# Patient Record
Sex: MALE | Race: WHITE | NOT HISPANIC OR LATINO | Employment: OTHER | URBAN - METROPOLITAN AREA
[De-identification: names, ages, dates, MRNs, and addresses within clinical notes are randomized per-mention and may not be internally consistent; named-entity substitution may affect disease eponyms.]

---

## 2023-06-28 LAB
ERYTHROCYTE DISTRIBUTION WIDTH (RATIO) BY AUTOMATED COUNT: 13.3 % (ref 11.5–14.5)
ERYTHROCYTE MEAN CORPUSCULAR HEMOGLOBIN CONCENTRATION (G/DL) BY AUTOMATED: 32.5 G/DL (ref 32–36)
ERYTHROCYTE MEAN CORPUSCULAR VOLUME (FL) BY AUTOMATED COUNT: 89 FL (ref 80–100)
ERYTHROCYTES (10*6/UL) IN BLOOD BY AUTOMATED COUNT: 5.13 X10E12/L (ref 4.5–5.9)
ESTRADIOL (PG/ML) IN SER/PLAS: 22 PG/ML
HEMATOCRIT (%) IN BLOOD BY AUTOMATED COUNT: 45.8 % (ref 41–52)
HEMOGLOBIN (G/DL) IN BLOOD: 14.9 G/DL (ref 13.5–17.5)
LEUKOCYTES (10*3/UL) IN BLOOD BY AUTOMATED COUNT: 6.7 X10E9/L (ref 4.4–11.3)
NRBC (PER 100 WBCS) BY AUTOMATED COUNT: 0 /100 WBC (ref 0–0)
PLATELETS (10*3/UL) IN BLOOD AUTOMATED COUNT: 243 X10E9/L (ref 150–450)
PROSTATE SPECIFIC AG (NG/ML) IN SER/PLAS: 1.01 NG/ML (ref 0–4)
TESTOSTERONE (NG/DL) IN SER/PLAS: 291 NG/DL (ref 240–1000)

## 2024-01-12 ENCOUNTER — LAB (OUTPATIENT)
Dept: LAB | Facility: LAB | Age: 69
End: 2024-01-12
Payer: MEDICARE

## 2024-01-12 DIAGNOSIS — R79.89 OTHER SPECIFIED ABNORMAL FINDINGS OF BLOOD CHEMISTRY: ICD-10-CM

## 2024-01-12 DIAGNOSIS — N40.1 BENIGN PROSTATIC HYPERPLASIA WITH LOWER URINARY TRACT SYMPTOMS: Primary | ICD-10-CM

## 2024-01-12 LAB
ERYTHROCYTE [DISTWIDTH] IN BLOOD BY AUTOMATED COUNT: 12.6 % (ref 11.5–14.5)
ESTRADIOL SERPL-MCNC: 23 PG/ML
HCT VFR BLD AUTO: 47.7 % (ref 41–52)
HGB BLD-MCNC: 15.7 G/DL (ref 13.5–17.5)
MCH RBC QN AUTO: 29.6 PG (ref 26–34)
MCHC RBC AUTO-ENTMCNC: 32.9 G/DL (ref 32–36)
MCV RBC AUTO: 90 FL (ref 80–100)
NRBC BLD-RTO: 0 /100 WBCS (ref 0–0)
PLATELET # BLD AUTO: 207 X10*3/UL (ref 150–450)
PSA SERPL-MCNC: 1.18 NG/ML
RBC # BLD AUTO: 5.3 X10*6/UL (ref 4.5–5.9)
TESTOST SERPL-MCNC: 118 NG/DL (ref 240–1000)
WBC # BLD AUTO: 8.5 X10*3/UL (ref 4.4–11.3)

## 2024-01-12 PROCEDURE — 84403 ASSAY OF TOTAL TESTOSTERONE: CPT

## 2024-01-12 PROCEDURE — 85027 COMPLETE CBC AUTOMATED: CPT

## 2024-01-12 PROCEDURE — 36415 COLL VENOUS BLD VENIPUNCTURE: CPT

## 2024-01-12 PROCEDURE — 84153 ASSAY OF PSA TOTAL: CPT

## 2024-01-12 PROCEDURE — 82670 ASSAY OF TOTAL ESTRADIOL: CPT

## 2024-01-22 ENCOUNTER — OFFICE VISIT (OUTPATIENT)
Dept: UROLOGY | Facility: HOSPITAL | Age: 69
End: 2024-01-22
Payer: MEDICARE

## 2024-01-22 DIAGNOSIS — Z12.5 PROSTATE CANCER SCREENING: ICD-10-CM

## 2024-01-22 DIAGNOSIS — N40.0 BENIGN PROSTATIC HYPERPLASIA WITHOUT LOWER URINARY TRACT SYMPTOMS: ICD-10-CM

## 2024-01-22 DIAGNOSIS — R79.89 LOW TESTOSTERONE: Primary | ICD-10-CM

## 2024-01-22 DIAGNOSIS — E29.1 HYPOGONADISM IN MALE: ICD-10-CM

## 2024-01-22 PROCEDURE — 1036F TOBACCO NON-USER: CPT | Performed by: UROLOGY

## 2024-01-22 PROCEDURE — 1159F MED LIST DOCD IN RCRD: CPT | Performed by: UROLOGY

## 2024-01-22 PROCEDURE — 99214 OFFICE O/P EST MOD 30 MIN: CPT | Performed by: UROLOGY

## 2024-01-22 NOTE — PROGRESS NOTES
FUV    Last seen - 7/12/23     HISTORY OF PRESENT ILLNESS:   Stephen Miranda is a 68 y.o. male who is being seen today for fuv    h/o low testosterone, BPH, s/p PVP, s/p TURP on 10/06/20, presenting for a follow up.      Pt on androgel. Applying to his leg. Has been feeling well, exercising     Urinary symptoms - denies dysuria, hematuria       PAST MEDICAL HISTORY:  Past Medical History:   Diagnosis Date    Personal history of other malignant neoplasm of skin     History of malignant neoplasm of skin    Personal history of other medical treatment     History of echocardiogram       PAST SURGICAL HISTORY:  Past Surgical History:   Procedure Laterality Date    OTHER SURGICAL HISTORY  08/26/2020    Hernia repair    OTHER SURGICAL HISTORY  08/26/2020    Hip replacement    OTHER SURGICAL HISTORY  08/26/2020    Cholecystectomy    OTHER SURGICAL HISTORY  08/26/2020    Back surgery    OTHER SURGICAL HISTORY  10/22/2020    Cardiac catheterization        ALLERGIES:   Allergies   Allergen Reactions    Amoxicillin Diarrhea        MEDICATIONS:   No current outpatient medications     PHYSICAL EXAM:  There were no vitals taken for this visit.  Constitutional: Patient appears well-developed and well-nourished. No distress.    Pulmonary/Chest: Effort normal. No respiratory distress.   Abdominal: Soft, ND NT  Musculoskeletal: Normal range of motion.    Neurological: Alert and oriented to person, place, and time.  Psychiatric: Normal mood and affect. Behavior is normal. Thought content normal.      Labs  Lab Results   Component Value Date    TESTOSTERONE 118 (L) 01/12/2024    TESTOSTERONE 291 06/28/2023    TESTOSTERONE 138 (L) 12/30/2022     Lab Results   Component Value Date    ESTRADIOL 23 01/12/2024     Lab Results   Component Value Date    PSA 1.18 01/12/2024     Lab Results   Component Value Date    CREATININE 0.96 04/07/2021     Lab Results   Component Value Date    CHOL 189 09/20/2020     Lab Results   Component Value Date     HDL 56.0 09/20/2020     Lab Results   Component Value Date    CHHDL 3.4 09/20/2020     Lab Results   Component Value Date    LDLF 110 (H) 09/20/2020     Lab Results   Component Value Date    VLDL 23 09/20/2020     Lab Results   Component Value Date    TRIG 117 09/20/2020     Lab Results   Component Value Date    HGBA1C 5.7 09/20/2020     Lab Results   Component Value Date    HCT 47.7 01/12/2024       Assessment:      1. Low testosterone        2. Benign prostatic hyperplasia without lower urinary tract symptoms            Stephen Miranda is a 68 y.o. male here for FUV     Plan:   1)  Refilled androgel    Testosterone replacement therapy  The patient's lab work and clinical symptoms suggest that he has hypogonadism or testosterone deficiency.  I had a long discussion with the patient about the treatment options of his condition. This included lifestyle modification as well as supplementation.   Exogenous testosterone as well as the use of selective estrogen receptor modulators (SERMs), aromatase inhibitors, human gonadotropins were discussed.  The several treatment options including various formulations were discussed. I explained the risks, benefits, mechanisms, and use of each therapy.  I described potential side effects of treatment. Common side effects include acne, erythrocytosis, breast tenderness, and changes in behavior.  I highlighted the effect of exogenous testosterone on the reproductive system, including decrease in testicular volume and the negative affect on sperm production which may result in sterility, which could be permanent.    We discussed that current data suggest that external testosterone therapy does not cause an increase in the risk of developing prostate cancer, and can also be used safely in patients after prostate cancer treatment.  It may however cause an increase in prostate volume and PSA.  We discussed that the there is controversy in replacement therapies effect on the cardiovascular  system.     We also discussed the precautions necessary to keep the medication from contacting others, especially women and children, for whom contact with the medication can be particularly harmful.   He will use soap and water to wash his hands after application and will clean his skin before having skin-to-skin contact with others if he chooses gel application delivery of testosterone.  A monitoring schedule was discussed with includes routine evaluation of serum testosterone, hematocrit, lipid panel, and PSA.  No barriers to learning were identified.  After all of the patient's questions were satisfactorily answered, he expressed understanding of the risks of therapy.    I have personally reviewed the OARRS report for this patient. This report is scanned into the electronic medical record. I have considered the  risks of abuse, dependence, addiction and diversion.    2) Lower Urinary Tract Symptoms (LUTS)    I educated the patient on relevant lower urinary tract anatomy and physiology with emphasis on differential diagnosis as well as contributing factors to the patient's lower urinary tract symptoms. I educated the patient on dietary and behavioral modifications pertinent to the patient's complaints. I recommended to avoid caffeine, alcohol, spicy and acidic oral intake and to regulate fluid intake and voiding (timed voiding, double voiding). I stressed the importance of avoiding constipation and recommended stool softeners unless diarrhea present. We discussed limiting fluid intake at night and elevating legs prior to bedtime.     The mechanism of action as well as the risks, benefits, common side effects, and alternatives to all prescribed medications were discussed with the patient at length. The patient had the opportunity to ask questions and all questions were answered. I primarily discussed alpha blockers, 5ARIs, PDE5i, anticholinergics, and beta 3 agonist (mirabegron).  I explained that alpha blockers  help decrease bladder outlet resistance with potential side effects to include retrograde ejaculation, rhinitis, and light headedness. I explained that 5 alpha reductase inhibitors can shrink the prostate up to 30%, can artificially decrease their PSA value by 50%, but take approximately 6-9 months to reach full efficacy and have potential side effects to include decreased libido, impotence and breast tenderness.

## 2024-02-12 ENCOUNTER — LAB (OUTPATIENT)
Dept: LAB | Facility: LAB | Age: 69
End: 2024-02-12
Payer: MEDICARE

## 2024-02-12 DIAGNOSIS — Z00.00 ENCOUNTER FOR GENERAL ADULT MEDICAL EXAMINATION WITHOUT ABNORMAL FINDINGS: Primary | ICD-10-CM

## 2024-02-12 DIAGNOSIS — I10 ESSENTIAL (PRIMARY) HYPERTENSION: ICD-10-CM

## 2024-02-12 DIAGNOSIS — E78.2 MIXED HYPERLIPIDEMIA: ICD-10-CM

## 2024-02-12 LAB
ALBUMIN SERPL BCP-MCNC: 4.2 G/DL (ref 3.4–5)
ALP SERPL-CCNC: 69 U/L (ref 33–136)
ALT SERPL W P-5'-P-CCNC: 22 U/L (ref 10–52)
ANION GAP SERPL CALC-SCNC: 11 MMOL/L (ref 10–20)
AST SERPL W P-5'-P-CCNC: 16 U/L (ref 9–39)
BILIRUB SERPL-MCNC: 0.9 MG/DL (ref 0–1.2)
BUN SERPL-MCNC: 17 MG/DL (ref 6–23)
CALCIUM SERPL-MCNC: 9.6 MG/DL (ref 8.6–10.6)
CHLORIDE SERPL-SCNC: 108 MMOL/L (ref 98–107)
CHOLEST SERPL-MCNC: 133 MG/DL (ref 0–199)
CHOLESTEROL/HDL RATIO: 2.7
CO2 SERPL-SCNC: 28 MMOL/L (ref 21–32)
CREAT SERPL-MCNC: 0.83 MG/DL (ref 0.5–1.3)
EGFRCR SERPLBLD CKD-EPI 2021: >90 ML/MIN/1.73M*2
GLUCOSE SERPL-MCNC: 102 MG/DL (ref 74–99)
HDLC SERPL-MCNC: 48.9 MG/DL
LDLC SERPL CALC-MCNC: 71 MG/DL
NON HDL CHOLESTEROL: 84 MG/DL (ref 0–149)
POTASSIUM SERPL-SCNC: 4.2 MMOL/L (ref 3.5–5.3)
PROT SERPL-MCNC: 7 G/DL (ref 6.4–8.2)
SODIUM SERPL-SCNC: 143 MMOL/L (ref 136–145)
TRIGL SERPL-MCNC: 65 MG/DL (ref 0–149)
VLDL: 13 MG/DL (ref 0–40)

## 2024-02-12 PROCEDURE — 80053 COMPREHEN METABOLIC PANEL: CPT

## 2024-02-12 PROCEDURE — 36415 COLL VENOUS BLD VENIPUNCTURE: CPT

## 2024-02-12 PROCEDURE — 80061 LIPID PANEL: CPT

## 2024-05-06 PROBLEM — N39.0 ACUTE LOWER UTI (URINARY TRACT INFECTION): Status: ACTIVE | Noted: 2024-05-06

## 2024-05-06 PROBLEM — I25.10 CORONARY ARTERY DISEASE INVOLVING NATIVE CORONARY ARTERY OF NATIVE HEART WITHOUT ANGINA PECTORIS: Status: ACTIVE | Noted: 2024-05-06

## 2024-05-06 PROBLEM — N28.89 RENAL MASS: Status: ACTIVE | Noted: 2024-05-06

## 2024-05-06 PROBLEM — M17.0 BILATERAL PRIMARY OSTEOARTHRITIS OF KNEE: Status: ACTIVE | Noted: 2024-02-09

## 2024-05-06 PROBLEM — E78.5 DYSLIPIDEMIA: Status: ACTIVE | Noted: 2024-05-06

## 2024-05-06 PROBLEM — I10 BENIGN ESSENTIAL HYPERTENSION: Status: ACTIVE | Noted: 2024-05-06

## 2024-05-06 PROBLEM — M21.061 ACQUIRED GENU VALGUM OF RIGHT KNEE: Status: ACTIVE | Noted: 2024-02-09

## 2024-05-06 PROBLEM — M21.162 GENU VARUM OF LEFT LOWER EXTREMITY: Status: ACTIVE | Noted: 2024-02-09

## 2024-05-06 PROBLEM — I71.21 ASCENDING AORTIC ANEURYSM (CMS-HCC): Status: ACTIVE | Noted: 2024-05-06

## 2024-05-17 ENCOUNTER — OFFICE VISIT (OUTPATIENT)
Dept: CARDIOLOGY | Facility: CLINIC | Age: 69
End: 2024-05-17
Payer: MEDICARE

## 2024-05-17 VITALS
HEIGHT: 75 IN | BODY MASS INDEX: 29.89 KG/M2 | OXYGEN SATURATION: 97 % | WEIGHT: 240.4 LBS | SYSTOLIC BLOOD PRESSURE: 132 MMHG | DIASTOLIC BLOOD PRESSURE: 88 MMHG | HEART RATE: 67 BPM

## 2024-05-17 DIAGNOSIS — I71.21 ANEURYSM OF ASCENDING AORTA WITHOUT RUPTURE (CMS-HCC): Primary | ICD-10-CM

## 2024-05-17 DIAGNOSIS — I25.10 CORONARY ARTERY DISEASE INVOLVING NATIVE CORONARY ARTERY OF NATIVE HEART WITHOUT ANGINA PECTORIS: ICD-10-CM

## 2024-05-17 DIAGNOSIS — R79.89 LOW TESTOSTERONE: ICD-10-CM

## 2024-05-17 DIAGNOSIS — I10 BENIGN ESSENTIAL HYPERTENSION: ICD-10-CM

## 2024-05-17 PROCEDURE — 93000 ELECTROCARDIOGRAM COMPLETE: CPT | Performed by: INTERNAL MEDICINE

## 2024-05-17 PROCEDURE — 99214 OFFICE O/P EST MOD 30 MIN: CPT | Performed by: INTERNAL MEDICINE

## 2024-05-17 PROCEDURE — 3079F DIAST BP 80-89 MM HG: CPT | Performed by: INTERNAL MEDICINE

## 2024-05-17 PROCEDURE — 3075F SYST BP GE 130 - 139MM HG: CPT | Performed by: INTERNAL MEDICINE

## 2024-05-17 PROCEDURE — 1036F TOBACCO NON-USER: CPT | Performed by: INTERNAL MEDICINE

## 2024-05-17 PROCEDURE — 1159F MED LIST DOCD IN RCRD: CPT | Performed by: INTERNAL MEDICINE

## 2024-05-17 RX ORDER — MAGNESIUM 250 MG
500 TABLET ORAL DAILY
Qty: 180 TABLET | Refills: 3 | Status: SHIPPED | OUTPATIENT
Start: 2024-05-17 | End: 2025-05-17

## 2024-05-17 RX ORDER — FLUOROURACIL 50 MG/G
1 CREAM TOPICAL DAILY PRN
COMMUNITY
Start: 2022-11-03

## 2024-05-17 RX ORDER — CHOLECALCIFEROL (VITAMIN D3) 25 MCG
1000 TABLET ORAL DAILY
Qty: 90 TABLET | Refills: 3 | Status: SHIPPED | OUTPATIENT
Start: 2024-05-17 | End: 2025-05-17

## 2024-05-17 RX ORDER — CHOLECALCIFEROL (VITAMIN D3) 25 MCG
1000 TABLET ORAL DAILY
COMMUNITY
End: 2024-05-17 | Stop reason: SDUPTHER

## 2024-05-17 RX ORDER — METOPROLOL SUCCINATE 50 MG/1
50 TABLET, EXTENDED RELEASE ORAL DAILY
COMMUNITY
End: 2024-05-17 | Stop reason: SDUPTHER

## 2024-05-17 RX ORDER — CLINDAMYCIN HYDROCHLORIDE 150 MG/1
150 CAPSULE ORAL
COMMUNITY
Start: 2024-01-29

## 2024-05-17 RX ORDER — CALCIPOTRIENE 50 UG/G
1 CREAM TOPICAL 2 TIMES DAILY
COMMUNITY
Start: 2022-11-03

## 2024-05-17 RX ORDER — METOPROLOL SUCCINATE 50 MG/1
50 TABLET, EXTENDED RELEASE ORAL DAILY
Qty: 90 TABLET | Refills: 3 | Status: SHIPPED | OUTPATIENT
Start: 2024-05-17 | End: 2025-05-17

## 2024-05-17 RX ORDER — AMLODIPINE BESYLATE 5 MG/1
5 TABLET ORAL DAILY
COMMUNITY
End: 2024-05-17 | Stop reason: SDUPTHER

## 2024-05-17 RX ORDER — MAGNESIUM 250 MG
500 TABLET ORAL DAILY
COMMUNITY
End: 2024-05-17 | Stop reason: SDUPTHER

## 2024-05-17 RX ORDER — ATORVASTATIN CALCIUM 40 MG/1
40 TABLET, FILM COATED ORAL DAILY
COMMUNITY
End: 2024-05-17 | Stop reason: SDUPTHER

## 2024-05-17 RX ORDER — AMLODIPINE BESYLATE 5 MG/1
5 TABLET ORAL DAILY
Qty: 90 TABLET | Refills: 3 | Status: SHIPPED | OUTPATIENT
Start: 2024-05-17 | End: 2025-05-17

## 2024-05-17 RX ORDER — ATORVASTATIN CALCIUM 40 MG/1
40 TABLET, FILM COATED ORAL DAILY
Qty: 90 TABLET | Refills: 3 | Status: SHIPPED | OUTPATIENT
Start: 2024-05-17 | End: 2025-05-17

## 2024-05-17 NOTE — PROGRESS NOTES
[facility] Outpatient Follow-up Visit     Reason for Visit: CAD    HPI: Stephen Miranda is a 68 y.o.  male who presents today for followup.     The patient is a 68-year-old male with a history of hypertension, BPH, family history of premature CAD who initially presented in September 2020 with chest pressure. Twelve-lead ECG demonstrated normal sinus rhythm, nonspecific ST-T wave changes. Cardiac enzymes are negative. Echocardiogram demonstrated an ejection fraction of 55 to 60%. Cardiac catheterization were revealed mild, nonobstructive coronary artery disease. Patient presents today for follow-up. He denies any chest discomfort, shortness of breath, palpitations, lightheadedness, syncope, orthopnea, PND. Admits to occasional lower extremity edema.     5/13/22: 12-lead ECG demonstrates normal sinus rhythm, nonspecific changes.   Echocardiogram September 2020 with an ejection fraction 55 to 60%, trivial aortic regurgitation, mildly dilated ascending aorta with a diameter of 3.9 cm.   Cardiac catheterization September 2020 demonstrates coronary artery calcification, mild, nonobstructive coronary artery disease. EDP 15.  Lipids 2022: Trig 83, HDL 54, LDL 59.  2/6/2023: , HDL 49, LDL 68, triglycerides 102.  5/12/2023 ECG: Normal sinus rhythm, nonspecific changes.   2/12/2024 , LDL 71, HDL 49, triglycerides 65.  5/17/2024 ECG: Sinus bradycardia, otherwise normal.    Past Medical History:   He has a past medical history of Personal history of other malignant neoplasm of skin and Personal history of other medical treatment.    Surgical History:   He has a past surgical history that includes Other surgical history (08/26/2020); Other surgical history (08/26/2020); Other surgical history (08/26/2020); Other surgical history (08/26/2020); and Other surgical history (10/22/2020).    Family History:   No family history on file.    Allergies:  Amoxicillin     Social History:    · Never smoker   ·  "Occasional alcohol use    Prior Cardiovascular Testing (Personally Reviewed):     Review of Systems:  Review of Systems   All other systems reviewed and are negative.      Outpatient Medications:  No current outpatient medications on file.     Last Recorded Vitals  /88 (BP Location: Left arm, Patient Position: Sitting, BP Cuff Size: Adult)   Pulse 67   Ht 1.892 m (6' 2.5\")   Wt 109 kg (240 lb 6.4 oz)   SpO2 97%   BMI 30.45 kg/m²     Physical Exam:    Physical Exam  Vitals reviewed.   Constitutional:       Appearance: Normal appearance.   HENT:      Head: Normocephalic and atraumatic.      Mouth/Throat:      Mouth: Mucous membranes are moist.      Pharynx: Oropharynx is clear.   Eyes:      Extraocular Movements: Extraocular movements intact.      Conjunctiva/sclera: Conjunctivae normal.   Cardiovascular:      Rate and Rhythm: Normal rate and regular rhythm.      Pulses: Normal pulses.      Heart sounds: Normal heart sounds.   Pulmonary:      Effort: Pulmonary effort is normal.      Breath sounds: Normal breath sounds.   Abdominal:      General: Bowel sounds are normal.      Palpations: Abdomen is soft.   Musculoskeletal:         General: No swelling.      Cervical back: Neck supple.   Skin:     General: Skin is warm and dry.   Neurological:      General: No focal deficit present.      Mental Status: He is alert.   Psychiatric:         Mood and Affect: Mood normal.         Behavior: Behavior normal.         Lab/Radiology/Diagnostic Review:    Labs    Lab Results   Component Value Date    GLUCOSE 102 (H) 02/12/2024    CALCIUM 9.6 02/12/2024     02/12/2024    K 4.2 02/12/2024    CO2 28 02/12/2024     (H) 02/12/2024    BUN 17 02/12/2024    CREATININE 0.83 02/12/2024       Lab Results   Component Value Date    WBC 8.5 01/12/2024    HGB 15.7 01/12/2024    HCT 47.7 01/12/2024    MCV 90 01/12/2024     01/12/2024       Lab Results   Component Value Date    CHOL 133 02/12/2024    CHOL 189 " "09/20/2020     Lab Results   Component Value Date    HDL 48.9 02/12/2024    HDL 56.0 09/20/2020     Lab Results   Component Value Date    LDLCALC 71 02/12/2024     Lab Results   Component Value Date    TRIG 65 02/12/2024    TRIG 117 09/20/2020     No components found for: \"CHOLHDL\"    No results found for: \"BNP\"    No results found for: \"TSH\"    Assessment:   Patient is currently asymptomatic from a cardiac standpoint.     Patient has a mildly dilated ascending aorta with trivial aortic regurgitation. Would recommend tight blood pressure control to prevent further aortic dilatation. Continue amlodipine and metoprolol succinate 50mg daily.     Given the patient's coronary artery calcification, would continue aspirin and statin therapy.     Will check an echocardiogram prior to his next visit for reevaluation of his ascending aorta.    Patient can follow-up with us in 12 months or sooner if he has more problems.      Yvon Magallanes MD      "

## 2024-07-17 ENCOUNTER — LAB (OUTPATIENT)
Dept: LAB | Facility: LAB | Age: 69
End: 2024-07-17
Payer: MEDICARE

## 2024-07-17 DIAGNOSIS — Z12.5 PROSTATE CANCER SCREENING: ICD-10-CM

## 2024-07-17 DIAGNOSIS — E29.1 HYPOGONADISM IN MALE: ICD-10-CM

## 2024-07-17 LAB
ESTRADIOL SERPL-MCNC: 50 PG/ML
HCT VFR BLD AUTO: 49 % (ref 41–52)
LH SERPL-ACNC: 0.1 IU/L
PSA SERPL-MCNC: 1.71 NG/ML
TESTOST SERPL-MCNC: 387 NG/DL (ref 240–1000)

## 2024-07-17 PROCEDURE — 83002 ASSAY OF GONADOTROPIN (LH): CPT

## 2024-07-17 PROCEDURE — 85014 HEMATOCRIT: CPT

## 2024-07-17 PROCEDURE — 36415 COLL VENOUS BLD VENIPUNCTURE: CPT

## 2024-07-17 PROCEDURE — G0103 PSA SCREENING: HCPCS

## 2024-07-17 PROCEDURE — 82670 ASSAY OF TOTAL ESTRADIOL: CPT

## 2024-07-17 PROCEDURE — 84403 ASSAY OF TOTAL TESTOSTERONE: CPT

## 2024-07-25 ENCOUNTER — OFFICE VISIT (OUTPATIENT)
Dept: UROLOGY | Facility: HOSPITAL | Age: 69
End: 2024-07-25
Payer: MEDICARE

## 2024-07-25 DIAGNOSIS — Z12.5 PROSTATE CANCER SCREENING: ICD-10-CM

## 2024-07-25 DIAGNOSIS — R79.89 LOW TESTOSTERONE: Primary | ICD-10-CM

## 2024-07-25 DIAGNOSIS — E29.1 HYPOGONADISM IN MALE: ICD-10-CM

## 2024-07-25 PROCEDURE — 1036F TOBACCO NON-USER: CPT | Performed by: UROLOGY

## 2024-07-25 PROCEDURE — 99214 OFFICE O/P EST MOD 30 MIN: CPT | Performed by: UROLOGY

## 2024-07-25 PROCEDURE — G2211 COMPLEX E/M VISIT ADD ON: HCPCS | Performed by: UROLOGY

## 2024-07-25 PROCEDURE — 1159F MED LIST DOCD IN RCRD: CPT | Performed by: UROLOGY

## 2024-07-25 NOTE — PROGRESS NOTES
FUV    Last seen - 1/22/24     HISTORY OF PRESENT ILLNESS:   Stephen Miranda is a 69 y.o. male who is being seen today for fuv    h/o low testosterone, BPH, s/p PVP, s/p TURP on 10/06/20, presenting for a follow up.      Pt on androgel. Applying to his leg. Has been feeling well, exercising     Urinary symptoms - denies dysuria, hematuria     PAST MEDICAL HISTORY:  Past Medical History:   Diagnosis Date    Personal history of other malignant neoplasm of skin     History of malignant neoplasm of skin    Personal history of other medical treatment     History of echocardiogram     PAST SURGICAL HISTORY:  Past Surgical History:   Procedure Laterality Date    OTHER SURGICAL HISTORY  08/26/2020    Hernia repair    OTHER SURGICAL HISTORY  08/26/2020    Hip replacement    OTHER SURGICAL HISTORY  08/26/2020    Cholecystectomy    OTHER SURGICAL HISTORY  08/26/2020    Back surgery    OTHER SURGICAL HISTORY  10/22/2020    Cardiac catheterization        ALLERGIES:   Allergies   Allergen Reactions    Amoxicillin Diarrhea        MEDICATIONS:   Current Outpatient Medications   Medication Instructions    amLODIPine (NORVASC) 5 mg, oral, Daily    atorvastatin (LIPITOR) 40 mg, oral, Daily    calcipotriene (Dovonex) 0.005 % cream 1 Application, Topical, 2 times daily    cholecalciferol (VITAMIN D-3) 1,000 Units, oral, Daily    clindamycin (CLEOCIN) 150 mg, oral    coenzyme Q-10 300 mg, oral, Daily    fish oil concentrate (Omega-3) 120-180 mg capsule 1 g, oral, Daily    fluorouracil (Efudex) 5 % cream 1 Application, Topical, Daily PRN    magnesium 500 mg, oral, Daily    metoprolol succinate XL (TOPROL-XL) 50 mg, oral, Daily        PHYSICAL EXAM:  There were no vitals taken for this visit.  Constitutional: Patient appears well-developed and well-nourished. No distress.    Pulmonary/Chest: Effort normal. No respiratory distress.   Abdominal: Soft, ND NT  Musculoskeletal: Normal range of motion.    Neurological: Alert and oriented to  person, place, and time.  Psychiatric: Normal mood and affect. Behavior is normal. Thought content normal.      Labs  Lab Results   Component Value Date    TESTOSTERONE 387 07/17/2024    TESTOSTERONE 118 (L) 01/12/2024    TESTOSTERONE 291 06/28/2023     Lab Results   Component Value Date    ESTRADIOL 50 07/17/2024     Lab Results   Component Value Date    PSA 1.71 07/17/2024     Lab Results   Component Value Date    CREATININE 0.83 02/12/2024     Lab Results   Component Value Date    CHOL 133 02/12/2024     Lab Results   Component Value Date    HDL 48.9 02/12/2024     Lab Results   Component Value Date    CHHDL 2.7 02/12/2024     Lab Results   Component Value Date    LDLF 110 (H) 09/20/2020     Lab Results   Component Value Date    VLDL 13 02/12/2024     Lab Results   Component Value Date    TRIG 65 02/12/2024     Lab Results   Component Value Date    HGBA1C 5.7 09/20/2020     Lab Results   Component Value Date    HCT 49.0 07/17/2024       Assessment:      1. Low testosterone          Stephen Miranda is a 69 y.o. male here for FUV    Doing well     Plan:   1)  Refilled androgel through [facility]    Testosterone replacement therapy  The patient's lab work and clinical symptoms suggest that he has hypogonadism or testosterone deficiency.  I had a long discussion with the patient about the treatment options of his condition. This included lifestyle modification as well as supplementation.   Exogenous testosterone as well as the use of selective estrogen receptor modulators (SERMs), aromatase inhibitors, human gonadotropins were discussed.  The several treatment options including various formulations were discussed. I explained the risks, benefits, mechanisms, and use of each therapy.  I described potential side effects of treatment. Common side effects include acne, erythrocytosis, breast tenderness, and changes in behavior.  I highlighted the effect of exogenous testosterone on the reproductive system, including decrease  in testicular volume and the negative affect on sperm production which may result in sterility, which could be permanent.    We discussed that current data suggest that external testosterone therapy does not cause an increase in the risk of developing prostate cancer, and can also be used safely in patients after prostate cancer treatment.  It may however cause an increase in prostate volume and PSA.  We discussed that the there is controversy in replacement therapies effect on the cardiovascular system.     We also discussed the precautions necessary to keep the medication from contacting others, especially women and children, for whom contact with the medication can be particularly harmful.   He will use soap and water to wash his hands after application and will clean his skin before having skin-to-skin contact with others if he chooses gel application delivery of testosterone.  A monitoring schedule was discussed with includes routine evaluation of serum testosterone, hematocrit, lipid panel, and PSA.  No barriers to learning were identified.  After all of the patient's questions were satisfactorily answered, he expressed understanding of the risks of therapy.    I have personally reviewed the OARRS report for this patient. This report is scanned into the electronic medical record. I have considered the  risks of abuse, dependence, addiction and diversion.    2) Lower Urinary Tract Symptoms (LUTS)    I educated the patient on relevant lower urinary tract anatomy and physiology with emphasis on differential diagnosis as well as contributing factors to the patient's lower urinary tract symptoms. I educated the patient on dietary and behavioral modifications pertinent to the patient's complaints. I recommended to avoid caffeine, alcohol, spicy and acidic oral intake and to regulate fluid intake and voiding (timed voiding, double voiding). I stressed the importance of avoiding constipation and recommended stool  softeners unless diarrhea present. We discussed limiting fluid intake at night and elevating legs prior to bedtime.     The mechanism of action as well as the risks, benefits, common side effects, and alternatives to all prescribed medications were discussed with the patient at length. The patient had the opportunity to ask questions and all questions were answered. I primarily discussed alpha blockers, 5ARIs, PDE5i, anticholinergics, and beta 3 agonist (mirabegron).  I explained that alpha blockers help decrease bladder outlet resistance with potential side effects to include retrograde ejaculation, rhinitis, and light headedness. I explained that 5 alpha reductase inhibitors can shrink the prostate up to 30%, can artificially decrease their PSA value by 50%, but take approximately 6-9 months to reach full efficacy and have potential side effects to include decreased libido, impotence and breast tenderness.

## 2024-10-29 ENCOUNTER — OFFICE VISIT (OUTPATIENT)
Dept: URGENT CARE | Age: 69
End: 2024-10-29
Payer: MEDICARE

## 2024-10-29 VITALS
TEMPERATURE: 98.4 F | BODY MASS INDEX: 31.44 KG/M2 | HEART RATE: 87 BPM | DIASTOLIC BLOOD PRESSURE: 90 MMHG | HEIGHT: 74 IN | SYSTOLIC BLOOD PRESSURE: 137 MMHG | RESPIRATION RATE: 17 BRPM | WEIGHT: 245 LBS | OXYGEN SATURATION: 96 %

## 2024-10-29 DIAGNOSIS — R39.198 DIFFICULTY URINATING: ICD-10-CM

## 2024-10-29 DIAGNOSIS — R10.32 LEFT LOWER QUADRANT PAIN: Primary | ICD-10-CM

## 2024-10-29 LAB
POC APPEARANCE, URINE: ABNORMAL
POC BILIRUBIN, URINE: NEGATIVE
POC BLOOD, URINE: ABNORMAL
POC COLOR, URINE: YELLOW
POC GLUCOSE, URINE: NEGATIVE MG/DL
POC KETONES, URINE: NEGATIVE MG/DL
POC LEUKOCYTES, URINE: NEGATIVE
POC NITRITE,URINE: NEGATIVE
POC PH, URINE: 6 PH
POC PROTEIN, URINE: NEGATIVE MG/DL
POC SPECIFIC GRAVITY, URINE: 1.01
POC UROBILINOGEN, URINE: 0.2 EU/DL

## 2024-10-29 PROCEDURE — 99204 OFFICE O/P NEW MOD 45 MIN: CPT | Performed by: PHYSICIAN ASSISTANT

## 2024-10-29 PROCEDURE — 3075F SYST BP GE 130 - 139MM HG: CPT | Performed by: PHYSICIAN ASSISTANT

## 2024-10-29 PROCEDURE — 87086 URINE CULTURE/COLONY COUNT: CPT

## 2024-10-29 PROCEDURE — 3080F DIAST BP >= 90 MM HG: CPT | Performed by: PHYSICIAN ASSISTANT

## 2024-10-29 PROCEDURE — 3008F BODY MASS INDEX DOCD: CPT | Performed by: PHYSICIAN ASSISTANT

## 2024-10-29 PROCEDURE — 81003 URINALYSIS AUTO W/O SCOPE: CPT | Performed by: PHYSICIAN ASSISTANT

## 2024-10-29 PROCEDURE — 1036F TOBACCO NON-USER: CPT | Performed by: PHYSICIAN ASSISTANT

## 2024-10-29 PROCEDURE — 1159F MED LIST DOCD IN RCRD: CPT | Performed by: PHYSICIAN ASSISTANT

## 2024-10-29 ASSESSMENT — ENCOUNTER SYMPTOMS
PSYCHIATRIC NEGATIVE: 1
RESPIRATORY NEGATIVE: 1
NEUROLOGICAL NEGATIVE: 1
ALLERGIC/IMMUNOLOGIC NEGATIVE: 1
FEVER: 0
BACK PAIN: 1
ENDOCRINE NEGATIVE: 1
CARDIOVASCULAR NEGATIVE: 1
DYSURIA: 0
VOMITING: 0
HEMATURIA: 0
NAUSEA: 1
ABDOMINAL PAIN: 1
EYES NEGATIVE: 1
HEMATOLOGIC/LYMPHATIC NEGATIVE: 1
DIFFICULTY URINATING: 1

## 2024-10-31 ENCOUNTER — LAB (OUTPATIENT)
Dept: LAB | Facility: LAB | Age: 69
End: 2024-10-31
Payer: MEDICARE

## 2024-10-31 ENCOUNTER — OFFICE VISIT (OUTPATIENT)
Dept: UROLOGY | Facility: HOSPITAL | Age: 69
End: 2024-10-31
Payer: MEDICARE

## 2024-10-31 ENCOUNTER — HOSPITAL ENCOUNTER (OUTPATIENT)
Dept: RADIOLOGY | Facility: CLINIC | Age: 69
Discharge: HOME | End: 2024-10-31
Payer: MEDICARE

## 2024-10-31 DIAGNOSIS — R31.0 GROSS HEMATURIA: ICD-10-CM

## 2024-10-31 DIAGNOSIS — N40.0 BENIGN PROSTATIC HYPERPLASIA WITHOUT LOWER URINARY TRACT SYMPTOMS: Primary | ICD-10-CM

## 2024-10-31 LAB
BACTERIA UR CULT: NO GROWTH
POC APPEARANCE, URINE: CLEAR
POC BILIRUBIN, URINE: NEGATIVE
POC BLOOD, URINE: ABNORMAL
POC COLOR, URINE: ABNORMAL
POC GLUCOSE, URINE: NEGATIVE MG/DL
POC KETONES, URINE: NEGATIVE MG/DL
POC LEUKOCYTES, URINE: NEGATIVE
POC NITRITE,URINE: NEGATIVE
POC PH, URINE: 6 PH
POC PROTEIN, URINE: ABNORMAL MG/DL
POC SPECIFIC GRAVITY, URINE: 1.02
POC UROBILINOGEN, URINE: 0.2 EU/DL

## 2024-10-31 PROCEDURE — 99214 OFFICE O/P EST MOD 30 MIN: CPT | Performed by: UROLOGY

## 2024-10-31 PROCEDURE — 82565 ASSAY OF CREATININE: CPT

## 2024-10-31 PROCEDURE — 36415 COLL VENOUS BLD VENIPUNCTURE: CPT

## 2024-10-31 PROCEDURE — 51798 US URINE CAPACITY MEASURE: CPT | Performed by: UROLOGY

## 2024-10-31 PROCEDURE — G2211 COMPLEX E/M VISIT ADD ON: HCPCS | Performed by: UROLOGY

## 2024-10-31 PROCEDURE — 81003 URINALYSIS AUTO W/O SCOPE: CPT | Mod: QW | Performed by: UROLOGY

## 2024-11-01 ENCOUNTER — HOSPITAL ENCOUNTER (OUTPATIENT)
Dept: RADIOLOGY | Facility: CLINIC | Age: 69
Discharge: HOME | End: 2024-11-01
Payer: MEDICARE

## 2024-11-01 LAB
CREAT SERPL-MCNC: 1.08 MG/DL (ref 0.5–1.3)
EGFRCR SERPLBLD CKD-EPI 2021: 74 ML/MIN/1.73M*2

## 2024-11-01 PROCEDURE — 74178 CT ABD&PLV WO CNTR FLWD CNTR: CPT

## 2024-11-01 PROCEDURE — 2550000001 HC RX 255 CONTRASTS: Performed by: UROLOGY

## 2025-01-24 ENCOUNTER — LAB (OUTPATIENT)
Dept: LAB | Facility: LAB | Age: 70
End: 2025-01-24
Payer: MEDICARE

## 2025-01-24 DIAGNOSIS — E29.1 HYPOGONADISM IN MALE: ICD-10-CM

## 2025-01-24 DIAGNOSIS — Z12.5 PROSTATE CANCER SCREENING: ICD-10-CM

## 2025-01-24 LAB
ESTRADIOL SERPL-MCNC: 23 PG/ML
HCT VFR BLD AUTO: 47.1 % (ref 41–52)
LH SERPL-ACNC: 3.7 IU/L
PSA SERPL-MCNC: 1.34 NG/ML
TESTOST SERPL-MCNC: 44 NG/DL (ref 240–1000)

## 2025-01-24 PROCEDURE — 82670 ASSAY OF TOTAL ESTRADIOL: CPT

## 2025-01-24 PROCEDURE — 84403 ASSAY OF TOTAL TESTOSTERONE: CPT

## 2025-01-24 PROCEDURE — 85014 HEMATOCRIT: CPT

## 2025-01-24 PROCEDURE — 83002 ASSAY OF GONADOTROPIN (LH): CPT

## 2025-01-24 PROCEDURE — G0103 PSA SCREENING: HCPCS

## 2025-01-30 ENCOUNTER — APPOINTMENT (OUTPATIENT)
Dept: UROLOGY | Facility: HOSPITAL | Age: 70
End: 2025-01-30
Payer: MEDICARE

## 2025-01-30 VITALS — BODY MASS INDEX: 31.46 KG/M2 | HEIGHT: 74 IN

## 2025-01-30 DIAGNOSIS — E29.1 HYPOGONADISM IN MALE: ICD-10-CM

## 2025-01-30 DIAGNOSIS — N39.0 ACUTE LOWER UTI (URINARY TRACT INFECTION): ICD-10-CM

## 2025-01-30 DIAGNOSIS — Z12.5 PROSTATE CANCER SCREENING: ICD-10-CM

## 2025-01-30 DIAGNOSIS — R79.89 LOW TESTOSTERONE: Primary | ICD-10-CM

## 2025-01-30 LAB
POC APPEARANCE, URINE: CLEAR
POC BILIRUBIN, URINE: NEGATIVE
POC BLOOD, URINE: ABNORMAL
POC COLOR, URINE: YELLOW
POC GLUCOSE, URINE: NEGATIVE MG/DL
POC KETONES, URINE: NEGATIVE MG/DL
POC LEUKOCYTES, URINE: NEGATIVE
POC NITRITE,URINE: NEGATIVE
POC PH, URINE: 6 PH
POC PROTEIN, URINE: ABNORMAL MG/DL
POC SPECIFIC GRAVITY, URINE: >=1.03
POC UROBILINOGEN, URINE: 0.2 EU/DL

## 2025-01-30 PROCEDURE — 1036F TOBACCO NON-USER: CPT | Performed by: UROLOGY

## 2025-01-30 PROCEDURE — 51798 US URINE CAPACITY MEASURE: CPT | Performed by: UROLOGY

## 2025-01-30 PROCEDURE — 81003 URINALYSIS AUTO W/O SCOPE: CPT | Performed by: UROLOGY

## 2025-01-30 PROCEDURE — G2211 COMPLEX E/M VISIT ADD ON: HCPCS | Performed by: UROLOGY

## 2025-01-30 PROCEDURE — 1126F AMNT PAIN NOTED NONE PRSNT: CPT | Performed by: UROLOGY

## 2025-01-30 PROCEDURE — 99214 OFFICE O/P EST MOD 30 MIN: CPT | Performed by: UROLOGY

## 2025-01-30 PROCEDURE — 1159F MED LIST DOCD IN RCRD: CPT | Performed by: UROLOGY

## 2025-01-30 ASSESSMENT — PAIN SCALES - GENERAL: PAINLEVEL_OUTOF10: 0-NO PAIN

## 2025-01-30 NOTE — PROGRESS NOTES
FUV    Last seen - 10/31/24     HISTORY OF PRESENT ILLNESS:   Stephen Miranda is a 69 y.o. male who is being seen today for fuv    h/o low testosterone, BPH, s/p PVP, s/p TURP on 10/06/20     7/25/24 - Pt on androgel. Applying to his leg. Has been feeling well, exercising     10/31/24 - Pt started having urinary symptoms 2 days ago.  Was seen in urgent care.  UA trace blood by not signs of infection, Ucx neg.  Also had some constipation.  Having some lower abdominal pain.    CTU (11/1/24) -  mild wall thickening of left renal pelvis and ureter, trabeculated bladder    1/30/25 - Been doing well. Good stream, no dysuria. This morning had some darker colored urine.  On androgel ([facility]), has not taken recently.      PAST MEDICAL HISTORY:  Past Medical History:   Diagnosis Date    Personal history of other malignant neoplasm of skin     History of malignant neoplasm of skin    Personal history of other medical treatment     History of echocardiogram     PAST SURGICAL HISTORY:  Past Surgical History:   Procedure Laterality Date    OTHER SURGICAL HISTORY  08/26/2020    Hernia repair    OTHER SURGICAL HISTORY  08/26/2020    Hip replacement    OTHER SURGICAL HISTORY  08/26/2020    Cholecystectomy    OTHER SURGICAL HISTORY  08/26/2020    Back surgery    OTHER SURGICAL HISTORY  10/22/2020    Cardiac catheterization        ALLERGIES:   Allergies   Allergen Reactions    Amoxicillin Diarrhea        MEDICATIONS:   Current Outpatient Medications   Medication Instructions    amLODIPine (NORVASC) 5 mg, oral, Daily    atorvastatin (LIPITOR) 40 mg, oral, Daily    calcipotriene (Dovonex) 0.005 % cream 1 Application, Topical, 2 times daily    cholecalciferol (VITAMIN D-3) 1,000 Units, oral, Daily    clindamycin (CLEOCIN) 150 mg, oral    coenzyme Q-10 300 mg, oral, Daily    fish oil concentrate (Omega-3) 120-180 mg capsule 1 g, oral, Daily    fluorouracil (Efudex) 5 % cream 1 Application, Topical, Daily PRN    magnesium 500 mg, oral, Daily     metoprolol succinate XL (TOPROL-XL) 50 mg, oral, Daily        PHYSICAL EXAM:  There were no vitals taken for this visit.  Constitutional: Patient appears well-developed and well-nourished. No distress.    Pulmonary/Chest: Effort normal. No respiratory distress.   Abdominal: Soft, ND NT. No hernia  Musculoskeletal: Normal range of motion.    Neurological: Alert and oriented to person, place, and time.  Psychiatric: Normal mood and affect. Behavior is normal. Thought content normal.      Labs  Lab Results   Component Value Date    TESTOSTERONE 44 (L) 01/24/2025    TESTOSTERONE 387 07/17/2024    TESTOSTERONE 118 (L) 01/12/2024     Lab Results   Component Value Date    ESTRADIOL 23 01/24/2025     Lab Results   Component Value Date    PSA 1.34 01/24/2025     Lab Results   Component Value Date    CREATININE 1.08 10/31/2024     Lab Results   Component Value Date    CHOL 133 02/12/2024     Lab Results   Component Value Date    HDL 48.9 02/12/2024     Lab Results   Component Value Date    CHHDL 2.7 02/12/2024     Lab Results   Component Value Date    LDLF 110 (H) 09/20/2020     Lab Results   Component Value Date    VLDL 13 02/12/2024     Lab Results   Component Value Date    TRIG 65 02/12/2024     Lab Results   Component Value Date    HGBA1C 5.7 09/20/2020     Lab Results   Component Value Date    HCT 47.1 01/24/2025     PVR 16cc    Assessment:      1. Low testosterone  POCT UA Automated manually resulted      2. Acute lower UTI (urinary tract infection)  Measure post void residual      3. Hypogonadism in male  Luteinizing Hormone    Estradiol    Testosterone    Hematocrit    Luteinizing Hormone    Estradiol    Testosterone    Hematocrit      4. Prostate cancer screening  Prostate Specific Antigen    Prostate Specific Antigen        Stephen Miranda is a 69 y.o. male here for FUV       Plan:   Will restart testosterone  Labs in 6m  UA with trace blood and LE, hydrate well, avoid irritants.  Prior workup neg    Fu in 6m  sooner if needed    Lower Urinary Tract Symptoms (LUTS)    I educated the patient on relevant lower urinary tract anatomy and physiology with emphasis on differential diagnosis as well as contributing factors to the patient's lower urinary tract symptoms. I educated the patient on dietary and behavioral modifications pertinent to the patient's complaints. I recommended to avoid caffeine, alcohol, spicy and acidic oral intake and to regulate fluid intake and voiding (timed voiding, double voiding). I stressed the importance of avoiding constipation and recommended stool softeners unless diarrhea present. We discussed limiting fluid intake at night and elevating legs prior to bedtime.     The mechanism of action as well as the risks, benefits, common side effects, and alternatives to all prescribed medications were discussed with the patient at length. The patient had the opportunity to ask questions and all questions were answered. I primarily discussed alpha blockers, 5ARIs, PDE5i, anticholinergics, and beta 3 agonist (mirabegron).  I explained that alpha blockers help decrease bladder outlet resistance with potential side effects to include retrograde ejaculation, rhinitis, and light headedness. I explained that 5 alpha reductase inhibitors can shrink the prostate up to 30%, can artificially decrease their PSA value by 50%, but take approximately 6-9 months to reach full efficacy and have potential side effects to include decreased libido, impotence and breast tenderness.       Testosterone replacement therapy  The patient's lab work and clinical symptoms suggest that he has hypogonadism or testosterone deficiency.  I had a long discussion with the patient about the treatment options of his condition. This included lifestyle modification as well as supplementation.   Exogenous testosterone as well as the use of selective estrogen receptor modulators (SERMs), aromatase inhibitors, human gonadotropins were discussed.   The several treatment options including various formulations were discussed. I explained the risks, benefits, mechanisms, and use of each therapy.  I described potential side effects of treatment. Common side effects include acne, erythrocytosis, breast tenderness, and changes in behavior.  I highlighted the effect of exogenous testosterone on the reproductive system, including decrease in testicular volume and the negative affect on sperm production which may result in sterility, which could be permanent.    We discussed that current data suggest that external testosterone therapy does not cause an increase in the risk of developing prostate cancer, and can also be used safely in patients after prostate cancer treatment.  It may however cause an increase in prostate volume and PSA.  We discussed that the there is controversy in replacement therapies effect on the cardiovascular system.     We also discussed the precautions necessary to keep the medication from contacting others, especially women and children, for whom contact with the medication can be particularly harmful.   He will use soap and water to wash his hands after application and will clean his skin before having skin-to-skin contact with others if he chooses gel application delivery of testosterone.  A monitoring schedule was discussed with includes routine evaluation of serum testosterone, hematocrit, lipid panel, and PSA.  No barriers to learning were identified.  After all of the patient's questions were satisfactorily answered, he expressed understanding of the risks of therapy.    I have personally reviewed the OARRS report for this patient. This report is scanned into the electronic medical record. I have considered the  risks of abuse, dependence, addiction and diversion.

## 2025-05-16 ENCOUNTER — APPOINTMENT (OUTPATIENT)
Dept: CARDIOLOGY | Facility: CLINIC | Age: 70
End: 2025-05-16
Payer: MEDICARE

## 2025-06-02 DIAGNOSIS — I10 BENIGN ESSENTIAL HYPERTENSION: ICD-10-CM

## 2025-06-02 RX ORDER — METOPROLOL SUCCINATE 50 MG/1
50 TABLET, EXTENDED RELEASE ORAL DAILY
Qty: 90 TABLET | Refills: 3 | Status: SHIPPED | OUTPATIENT
Start: 2025-06-02 | End: 2026-06-02

## 2025-07-11 DIAGNOSIS — Z12.5 PROSTATE CANCER SCREENING: ICD-10-CM

## 2025-07-11 DIAGNOSIS — E29.1 HYPOGONADISM IN MALE: ICD-10-CM

## 2025-07-15 LAB
ESTRADIOL SERPL-MCNC: 28 PG/ML
HCT VFR BLD AUTO: 47 % (ref 38.5–50)
LH SERPL-ACNC: 2.9 MIU/ML (ref 1.6–15.2)
PSA SERPL-MCNC: 1.37 NG/ML
TESTOST SERPL-MCNC: 216 NG/DL (ref 250–827)

## 2025-07-17 DIAGNOSIS — I10 BENIGN ESSENTIAL HYPERTENSION: ICD-10-CM

## 2025-07-18 RX ORDER — AMLODIPINE BESYLATE 5 MG/1
5 TABLET ORAL DAILY
Qty: 90 TABLET | Refills: 3 | Status: SHIPPED | OUTPATIENT
Start: 2025-07-18 | End: 2026-07-18

## 2025-07-31 ENCOUNTER — APPOINTMENT (OUTPATIENT)
Dept: UROLOGY | Facility: HOSPITAL | Age: 70
End: 2025-07-31
Payer: MEDICARE

## 2025-07-31 DIAGNOSIS — R79.89 LOW TESTOSTERONE: Primary | ICD-10-CM

## 2025-07-31 PROCEDURE — 1159F MED LIST DOCD IN RCRD: CPT | Performed by: UROLOGY

## 2025-07-31 PROCEDURE — 99214 OFFICE O/P EST MOD 30 MIN: CPT | Performed by: UROLOGY

## 2025-07-31 PROCEDURE — G2211 COMPLEX E/M VISIT ADD ON: HCPCS | Performed by: UROLOGY

## 2025-07-31 NOTE — PROGRESS NOTES
FUV    Last seen - 1/30/25     HISTORY OF PRESENT ILLNESS:   Stephen Miranda is a 70 y.o. male who is being seen today for fuv    h/o low testosterone, BPH, s/p PVP, s/p TURP on 10/06/20     7/25/24 - Pt on androgel. Applying to his leg. Has been feeling well, exercising     10/31/24 - Pt started having urinary symptoms 2 days ago.  Was seen in urgent care.  UA trace blood by not signs of infection, Ucx neg.  Also had some constipation.  Having some lower abdominal pain.    CTU (11/1/24) -  mild wall thickening of left renal pelvis and ureter, trabeculated bladder    1/30/25 - Been doing well. Good stream, no dysuria. This morning had some darker colored urine.  On androgel ([facility]), has not taken recently.      Labs 7/14/25  T - 216  PSA 1.37  E  - 28  LH 2.9  H 47.0    7/31/25 - Been feeling well, urinating ok.  On T gel from [facility]    PAST MEDICAL HISTORY:  Past Medical History:   Diagnosis Date    Personal history of other malignant neoplasm of skin     History of malignant neoplasm of skin    Personal history of other medical treatment     History of echocardiogram     PAST SURGICAL HISTORY:  Past Surgical History:   Procedure Laterality Date    OTHER SURGICAL HISTORY  08/26/2020    Hernia repair    OTHER SURGICAL HISTORY  08/26/2020    Hip replacement    OTHER SURGICAL HISTORY  08/26/2020    Cholecystectomy    OTHER SURGICAL HISTORY  08/26/2020    Back surgery    OTHER SURGICAL HISTORY  10/22/2020    Cardiac catheterization        ALLERGIES:   Allergies   Allergen Reactions    Amoxicillin Diarrhea        MEDICATIONS:   Current Outpatient Medications   Medication Instructions    amLODIPine (NORVASC) 5 mg, oral, Daily    atorvastatin (LIPITOR) 40 mg, oral, Daily    calcipotriene (Dovonex) 0.005 % cream 1 Application, 2 times daily    clindamycin (CLEOCIN) 150 mg    fluorouracil (Efudex) 5 % cream 1 Application, Daily PRN    metoprolol succinate XL (TOPROL-XL) 50 mg, oral, Daily        PHYSICAL EXAM:  There were no  vitals taken for this visit.  Constitutional: Patient appears well-developed and well-nourished. No distress.    Pulmonary/Chest: Effort normal. No respiratory distress.   Abdominal: Soft, ND NT. No hernia  Musculoskeletal: Normal range of motion.    Neurological: Alert and oriented to person, place, and time.  Psychiatric: Normal mood and affect. Behavior is normal. Thought content normal.      Labs  Lab Results   Component Value Date    TESTOSTERONE 216 (L) 07/14/2025    TESTOSTERONE 44 (L) 01/24/2025    TESTOSTERONE 387 07/17/2024     Lab Results   Component Value Date    ESTRADIOL 28 07/14/2025     Lab Results   Component Value Date    PSA 1.37 07/14/2025     Lab Results   Component Value Date    CREATININE 1.08 10/31/2024     Lab Results   Component Value Date    CHOL 133 02/12/2024     Lab Results   Component Value Date    HDL 48.9 02/12/2024     Lab Results   Component Value Date    CHHDL 2.7 02/12/2024     Lab Results   Component Value Date    LDLF 110 (H) 09/20/2020     Lab Results   Component Value Date    VLDL 13 02/12/2024     Lab Results   Component Value Date    TRIG 65 02/12/2024     Lab Results   Component Value Date    HGBA1C 5.7 09/20/2020     Lab Results   Component Value Date    HCT 47.0 07/14/2025     Assessment:      1. Low testosterone          Stephen Miranda is a 70 y.o. male here for FUV    Plan:   Cont T gel  Labs in 6m    Fu in 6m sooner if needed    Lower Urinary Tract Symptoms (LUTS)    I educated the patient on relevant lower urinary tract anatomy and physiology with emphasis on differential diagnosis as well as contributing factors to the patient's lower urinary tract symptoms. I educated the patient on dietary and behavioral modifications pertinent to the patient's complaints. I recommended to avoid caffeine, alcohol, spicy and acidic oral intake and to regulate fluid intake and voiding (timed voiding, double voiding). I stressed the importance of avoiding constipation and recommended  stool softeners unless diarrhea present. We discussed limiting fluid intake at night and elevating legs prior to bedtime.     The mechanism of action as well as the risks, benefits, common side effects, and alternatives to all prescribed medications were discussed with the patient at length. The patient had the opportunity to ask questions and all questions were answered. I primarily discussed alpha blockers, 5ARIs, PDE5i, anticholinergics, and beta 3 agonist (mirabegron).  I explained that alpha blockers help decrease bladder outlet resistance with potential side effects to include retrograde ejaculation, rhinitis, and light headedness. I explained that 5 alpha reductase inhibitors can shrink the prostate up to 30%, can artificially decrease their PSA value by 50%, but take approximately 6-9 months to reach full efficacy and have potential side effects to include decreased libido, impotence and breast tenderness.       Testosterone replacement therapy  The patient's lab work and clinical symptoms suggest that he has hypogonadism or testosterone deficiency.  I had a long discussion with the patient about the treatment options of his condition. This included lifestyle modification as well as supplementation.   Exogenous testosterone as well as the use of selective estrogen receptor modulators (SERMs), aromatase inhibitors, human gonadotropins were discussed.  The several treatment options including various formulations were discussed. I explained the risks, benefits, mechanisms, and use of each therapy.  I described potential side effects of treatment. Common side effects include acne, erythrocytosis, breast tenderness, and changes in behavior.  I highlighted the effect of exogenous testosterone on the reproductive system, including decrease in testicular volume and the negative affect on sperm production which may result in sterility, which could be permanent.    We discussed that current data suggest that external  testosterone therapy does not cause an increase in the risk of developing prostate cancer, and can also be used safely in patients after prostate cancer treatment.  It may however cause an increase in prostate volume and PSA.  We discussed that the there is controversy in replacement therapies effect on the cardiovascular system.     We also discussed the precautions necessary to keep the medication from contacting others, especially women and children, for whom contact with the medication can be particularly harmful.   He will use soap and water to wash his hands after application and will clean his skin before having skin-to-skin contact with others if he chooses gel application delivery of testosterone.  A monitoring schedule was discussed with includes routine evaluation of serum testosterone, hematocrit, lipid panel, and PSA.  No barriers to learning were identified.  After all of the patient's questions were satisfactorily answered, he expressed understanding of the risks of therapy.    I have personally reviewed the OARRS report for this patient. This report is scanned into the electronic medical record. I have considered the  risks of abuse, dependence, addiction and diversion.